# Patient Record
Sex: MALE | Race: OTHER | HISPANIC OR LATINO | URBAN - METROPOLITAN AREA
[De-identification: names, ages, dates, MRNs, and addresses within clinical notes are randomized per-mention and may not be internally consistent; named-entity substitution may affect disease eponyms.]

---

## 2019-09-10 ENCOUNTER — EMERGENCY (EMERGENCY)
Facility: HOSPITAL | Age: 39
LOS: 1 days | Discharge: HOME | End: 2019-09-10
Attending: EMERGENCY MEDICINE
Payer: COMMERCIAL

## 2019-09-10 VITALS
HEART RATE: 88 BPM | RESPIRATION RATE: 17 BRPM | DIASTOLIC BLOOD PRESSURE: 67 MMHG | SYSTOLIC BLOOD PRESSURE: 110 MMHG | HEIGHT: 65 IN | WEIGHT: 154.32 LBS | TEMPERATURE: 98 F

## 2019-09-10 PROCEDURE — 99283 EMERGENCY DEPT VISIT LOW MDM: CPT

## 2019-09-10 PROCEDURE — T1013: CPT

## 2019-09-10 PROCEDURE — 99282 EMERGENCY DEPT VISIT SF MDM: CPT

## 2019-09-10 RX ORDER — CARBAMIDE PEROXIDE 81.86 MG/ML
1 SOLUTION/ DROPS AURICULAR (OTIC)
Qty: 1 | Refills: 0
Start: 2019-09-10 | End: 2019-09-13

## 2019-09-10 NOTE — ED ADULT TRIAGE NOTE - CHIEF COMPLAINT QUOTE
"I am having ear pain and I can't hear in the right ear and I hear like a whooshing sound and today I have a headache. " Pt denies pressure in face or difficulty swallowing.  Pt A & OX4. Pt took Ibuprofen at 2PM today and it helped a little.

## 2019-09-10 NOTE — ED PROVIDER NOTE - ENMT, MLM
Airway patent, Nasal mucosa clear. Mouth with normal mucosa. Throat has no vesicles, no oropharyngeal exudates and uvula is midline.  b/l  cerumen impaction, no erythema, no discharge, no pain on palpation

## 2019-09-10 NOTE — ED PROVIDER NOTE - OBJECTIVE STATEMENT
40 yo M Pt, no PmHx, presents to ED complaining of b/l ear pain x 1 day. Pt states he has decreased hearing and ear pain in both ears. PT states he tried to clean ears with a qtip and made symptoms worse. Pt admits to associated headache. Pt took 500 mg motrin prior to arrival with alleviation of pain. Pt denies fever, chills, visual changes, ear drainage, throat pain, and any other acute symptoms at this time.

## 2019-09-10 NOTE — ED PROVIDER NOTE - PATIENT PORTAL LINK FT
You can access the FollowMyHealth Patient Portal offered by [facility] by registering at the following website: [email or web address]. By joining [facility]’s FollowMyHealth portal, you will also be able to view your health information using other applications (apps) compatible with our system.

## 2019-09-10 NOTE — ED ADULT NURSE NOTE - OBJECTIVE STATEMENT
assumed patient care at 1621. Pt reports a 3-day history of left ear pain and not being able to hear in right ear. nausea present, denies vomiting

## 2019-12-03 NOTE — ED ADULT TRIAGE NOTE - MODE OF ARRIVAL
Encounter Date: 12/3/2019       History     Chief Complaint   Patient presents with    Hand Injury     right hand     Presents with complaint of right hand pain when he was hit by a drill he was using.  Pt is unable to make a fist         Review of patient's allergies indicates:  No Known Allergies  No past medical history on file.  No past surgical history on file.  No family history on file.  Social History     Tobacco Use    Smoking status: Not on file   Substance Use Topics    Alcohol use: Not on file    Drug use: Not on file     Review of Systems   Constitutional: Negative for fever.   Respiratory: Negative for cough, shortness of breath and wheezing.    Cardiovascular: Negative for chest pain, palpitations and leg swelling.   Gastrointestinal: Negative for abdominal pain, diarrhea, nausea and vomiting.   Musculoskeletal: Negative for back pain.        Right hand pain and swelling   Skin: Negative for rash.   Neurological: Negative for weakness.       Physical Exam     Initial Vitals [12/03/19 1408]   BP Pulse Resp Temp SpO2   125/68 68 18 97.9 °F (36.6 °C) 99 %      MAP       --         Physical Exam    Constitutional: He appears well-developed and well-nourished.   HENT:   Mouth/Throat: Oropharynx is clear and moist.   Eyes: Conjunctivae are normal.   Neck: Normal range of motion. Neck supple.   Cardiovascular: Normal rate.   Pulmonary/Chest: Breath sounds normal.   Musculoskeletal: Normal range of motion.   Right hand swelling with pain to palpation  Pt is unable to make a fist   Neurological: He is alert and oriented to person, place, and time. No sensory deficit. GCS score is 15. GCS eye subscore is 4. GCS verbal subscore is 5. GCS motor subscore is 6.   Skin: Skin is warm. Capillary refill takes less than 2 seconds.   Psychiatric: He has a normal mood and affect. Thought content normal.         ED Course   Splint Application  Date/Time: 12/3/2019 3:24 PM  Performed by: Carla Breaux  NP  Authorized by: Carla Breaux NP   Location details: right hand  Splint type: ulnar gutter  Supplies used: Ortho-Glass  Post-procedure: The splinted body part was neurovascularly unchanged following the procedure.  Patient tolerance: Patient tolerated the procedure well with no immediate complications        Labs Reviewed - No data to display       Imaging Results          X-Ray Hand 3 View Right (Final result)  Result time 12/03/19 15:03:29    Final result by Chito Hernandez MD (12/03/19 15:03:29)                 Impression:      1. Acute 4th metacarpal fracture as discussed above.  2. 2 mm radiopaque density overlying the 4th metacarpal likely reflects a tiny osseous fragment as opposed to a soft tissue foreign body.      Electronically signed by: Chito Hernandez MD  Date:    12/03/2019  Time:    15:03             Narrative:    EXAMINATION:  XR HAND COMPLETE 3 VIEW RIGHT    CLINICAL HISTORY:  trauma;.    COMPARISON:  None.    FINDINGS:  Three views are submitted.    There is no acute obliquely oriented non comminuted fracture centered to the mid shaft of the 4th metacarpal, with mild anterior angulation of the distal fragment.  2 mm density overlying the 4th metacarpal at the fracture site is probably a tiny osseous fragment as opposed to soft tissue foreign body.    No other fractures are identified.  There is no dislocation.  No osseous destructive lesion is demonstrated.  Soft tissues are unremarkable.                                                                 Clinical Impression:       ICD-10-CM ICD-9-CM   1. Closed nondisplaced fracture of other part of fourth metacarpal bone of right hand, initial encounter S62.394A 815.00                             Carla Breaux NP  12/03/19 1528     Walk in